# Patient Record
Sex: FEMALE | Race: BLACK OR AFRICAN AMERICAN | NOT HISPANIC OR LATINO | Employment: UNEMPLOYED | URBAN - METROPOLITAN AREA
[De-identification: names, ages, dates, MRNs, and addresses within clinical notes are randomized per-mention and may not be internally consistent; named-entity substitution may affect disease eponyms.]

---

## 2022-10-05 ENCOUNTER — TELEPHONE (OUTPATIENT)
Dept: NEUROLOGY | Facility: CLINIC | Age: 22
End: 2022-10-05

## 2022-10-05 NOTE — TELEPHONE ENCOUNTER
Maddy Nation from Dr Wilman Sellers office called to schedule this patient with one of our providers in [place] for stroke  She advised that they will be faxing over a referral and all her notes  She provided the patient's insurance information and I offered her 2-6-23 at 220 pm with Dr Bety Terry and I added her to the wait list and she accepted